# Patient Record
Sex: FEMALE | Race: WHITE | NOT HISPANIC OR LATINO | URBAN - METROPOLITAN AREA
[De-identification: names, ages, dates, MRNs, and addresses within clinical notes are randomized per-mention and may not be internally consistent; named-entity substitution may affect disease eponyms.]

---

## 2020-06-10 ENCOUNTER — IMPORTED ENCOUNTER (OUTPATIENT)
Dept: URBAN - METROPOLITAN AREA CLINIC 9 | Facility: CLINIC | Age: 65
End: 2020-06-10

## 2020-06-22 ENCOUNTER — IMPORTED ENCOUNTER (OUTPATIENT)
Dept: URBAN - METROPOLITAN AREA CLINIC 9 | Facility: CLINIC | Age: 65
End: 2020-06-22

## 2020-07-06 ENCOUNTER — IMPORTED ENCOUNTER (OUTPATIENT)
Dept: URBAN - METROPOLITAN AREA CLINIC 9 | Facility: CLINIC | Age: 65
End: 2020-07-06

## 2020-07-17 ENCOUNTER — IMPORTED ENCOUNTER (OUTPATIENT)
Dept: URBAN - METROPOLITAN AREA CLINIC 9 | Facility: CLINIC | Age: 65
End: 2020-07-17

## 2020-07-23 ENCOUNTER — IMPORTED ENCOUNTER (OUTPATIENT)
Dept: URBAN - METROPOLITAN AREA CLINIC 9 | Facility: CLINIC | Age: 65
End: 2020-07-23

## 2020-07-27 ENCOUNTER — IMPORTED ENCOUNTER (OUTPATIENT)
Dept: URBAN - METROPOLITAN AREA CLINIC 9 | Facility: CLINIC | Age: 65
End: 2020-07-27

## 2020-07-30 ENCOUNTER — IMPORTED ENCOUNTER (OUTPATIENT)
Dept: URBAN - METROPOLITAN AREA CLINIC 9 | Facility: CLINIC | Age: 65
End: 2020-07-30

## 2020-08-17 ENCOUNTER — IMPORTED ENCOUNTER (OUTPATIENT)
Dept: URBAN - METROPOLITAN AREA CLINIC 9 | Facility: CLINIC | Age: 65
End: 2020-08-17

## 2020-08-20 ENCOUNTER — IMPORTED ENCOUNTER (OUTPATIENT)
Dept: URBAN - METROPOLITAN AREA CLINIC 9 | Facility: CLINIC | Age: 65
End: 2020-08-20

## 2020-08-24 ENCOUNTER — IMPORTED ENCOUNTER (OUTPATIENT)
Dept: URBAN - METROPOLITAN AREA CLINIC 9 | Facility: CLINIC | Age: 65
End: 2020-08-24

## 2020-08-27 ENCOUNTER — IMPORTED ENCOUNTER (OUTPATIENT)
Dept: URBAN - METROPOLITAN AREA CLINIC 9 | Facility: CLINIC | Age: 65
End: 2020-08-27

## 2020-08-31 ENCOUNTER — IMPORTED ENCOUNTER (OUTPATIENT)
Dept: URBAN - METROPOLITAN AREA CLINIC 9 | Facility: CLINIC | Age: 65
End: 2020-08-31

## 2020-09-23 ENCOUNTER — IMPORTED ENCOUNTER (OUTPATIENT)
Dept: URBAN - METROPOLITAN AREA CLINIC 9 | Facility: CLINIC | Age: 65
End: 2020-09-23

## 2020-09-23 PROBLEM — Z96.1: Noted: 2020-09-23

## 2021-03-18 NOTE — PATIENT DISCUSSION
Recommend Bilateral lower lid blepharoplasty trans conj laser (discussed risks and benefits of sx. ..). DO NOT REMOVE LLL HEMANGIOMA.

## 2021-03-18 NOTE — PATIENT DISCUSSION
Recommend Mini lower lift, post-tragel, SMAS to chin/cheeks(discussed risks and benefits of sx. .. ).

## 2021-10-16 ASSESSMENT — KERATOMETRY
OS_K1POWER_DIOPTERS: 41.25
OD_K2POWER_DIOPTERS: 42
OS_K1POWER_DIOPTERS: 41.5
OD_K1POWER_DIOPTERS: 41
OD_K1POWER_DIOPTERS: 41
OD_K1POWER_DIOPTERS: 41.25
OS_AXISANGLE2_DEGREES: 98
OD_K2POWER_DIOPTERS: 41.5
OS_K2POWER_DIOPTERS: 42.25
OS_AXISANGLE2_DEGREES: 81
OD_AXISANGLE2_DEGREES: 86
OS_K1POWER_DIOPTERS: 41.5
OS_K2POWER_DIOPTERS: 42.25
OD_AXISANGLE_DEGREES: 4
OD_K1POWER_DIOPTERS: 41
OS_AXISANGLE_DEGREES: 169
OD_AXISANGLE_DEGREES: 180
OD_AXISANGLE_DEGREES: 173
OD_K1POWER_DIOPTERS: 41
OS_K2POWER_DIOPTERS: 42
OD_K1POWER_DIOPTERS: 41
OS_AXISANGLE_DEGREES: 165
OD_AXISANGLE2_DEGREES: 83
OD_AXISANGLE2_DEGREES: 94
OS_K2POWER_DIOPTERS: 42.25
OS_K2POWER_DIOPTERS: 42
OS_K1POWER_DIOPTERS: 41.25
OS_AXISANGLE2_DEGREES: 85
OD_AXISANGLE_DEGREES: 1
OS_AXISANGLE2_DEGREES: 75
OD_AXISANGLE2_DEGREES: 91
OS_AXISANGLE_DEGREES: 175
OD_K2POWER_DIOPTERS: 41.5
OD_AXISANGLE2_DEGREES: 72
OD_K2POWER_DIOPTERS: 41
OD_K2POWER_DIOPTERS: 41.25
OS_AXISANGLE_DEGREES: 171
OD_K2POWER_DIOPTERS: 41.5
OD_AXISANGLE_DEGREES: 176
OS_K1POWER_DIOPTERS: 41.5
OD_AXISANGLE_DEGREES: 162
OS_AXISANGLE2_DEGREES: 79
OD_AXISANGLE2_DEGREES: 90
OS_AXISANGLE_DEGREES: 8

## 2021-10-16 ASSESSMENT — VISUAL ACUITY
OD_SC: 20/25 SN
OS_CC: 20/25 SN
OS_PH: 20/60 SN
OD_SC: 20/70 SN
OD_CC: 20/20 SN
OS_SC: 20/80 SN
OS_SC: 20/400 SN
OS_CC: 20/20 SN
OS_PH: 20/60 -2 SN
OS_CC: 20/20 SN
OS_CC: 20/20 SN
OS_SC: 20/200 SN
OD_CC: 20/20 SN
OD_SC: 20/30 SN
OD_CC: 20/25 SN
OD_PH: 20/30 SN
OD_SC: 20/30 SN
OS_CC: 20/25 SN
OS_CC: 20/20 SN
OD_SC: 20/70 - SN
OD_CC: 20/20 - SN
OS_SC: 20/200 SN
OD_CC: 20/20 SN
OD_CC: 20/20 - SN
OD_CC: 20/30 SN
OS_SC: 20/30 SN
OS_SC: 20/200 SN
OD_SC: 20/100 SN
OD_PH: 20/40 SN
OD_SC: 20/20 SN
OS_CC: 20/20 SN
OS_CC: 20/20 SN
OS_SC: 20/200 SN
OS_SC: 20/25 SN
OD_CC: 20/25 SN
OD_SC: 20/100 SN
OD_SC: 20/50 SN
OS_CC: 20/25 SN
OD_SC: 20/80 SN
OD_PH: 20/40 SN
OS_SC: 20/200 SN
OS_SC: 20/100 SN
OD_SC: 20/30 SN
OS_SC: 20/200 SN
OD_CC: 20/20 SN
OD_CC: 20/20 SN
OD_SC: 20/60 SN

## 2021-10-16 ASSESSMENT — TONOMETRY
OS_IOP_MMHG: 13
OS_IOP_MMHG: 11
OS_IOP_MMHG: 12
OD_IOP_MMHG: 14
OD_IOP_MMHG: 11
OD_IOP_MMHG: 11
OD_IOP_MMHG: 13
OD_IOP_MMHG: 16
OS_IOP_MMHG: 14
OD_IOP_MMHG: 11
OS_IOP_MMHG: 11
OS_IOP_MMHG: 14

## 2021-10-22 ENCOUNTER — ESTABLISHED PATIENT (OUTPATIENT)
Dept: URBAN - METROPOLITAN AREA CLINIC 14 | Facility: CLINIC | Age: 66
End: 2021-10-22

## 2021-10-22 DIAGNOSIS — H35.373: ICD-10-CM

## 2021-10-22 PROCEDURE — 92014 COMPRE OPH EXAM EST PT 1/>: CPT

## 2021-10-22 PROCEDURE — 92015 DETERMINE REFRACTIVE STATE: CPT

## 2021-10-22 ASSESSMENT — KERATOMETRY
OD_AXISANGLE2_DEGREES: 72
OD_K2POWER_DIOPTERS: 41.5
OS_K1POWER_DIOPTERS: 41.25
OS_AXISANGLE_DEGREES: 171
OS_K2POWER_DIOPTERS: 42.25
OD_K1POWER_DIOPTERS: 41
OD_AXISANGLE_DEGREES: 162
OS_AXISANGLE2_DEGREES: 81

## 2021-10-22 ASSESSMENT — VISUAL ACUITY
OS_SC: 20/50
OS_GLARE: 20/25
OD_GLARE: 20/20
OU_SC: 20/25+1
OD_SC: 20/25

## 2021-10-22 ASSESSMENT — TONOMETRY
OS_IOP_MMHG: 14
OD_IOP_MMHG: 14

## 2022-07-02 RX ORDER — AMOXICILLIN 500 MG/1
1 CAPSULE ORAL
COMMUNITY

## 2023-01-19 ENCOUNTER — COMPREHENSIVE EXAM (OUTPATIENT)
Dept: URBAN - METROPOLITAN AREA CLINIC 16 | Facility: CLINIC | Age: 68
End: 2023-01-19

## 2023-01-19 DIAGNOSIS — Z96.1: ICD-10-CM

## 2023-01-19 DIAGNOSIS — H35.371: ICD-10-CM

## 2023-01-19 DIAGNOSIS — H43.813: ICD-10-CM

## 2023-01-19 DIAGNOSIS — H04.123: ICD-10-CM

## 2023-01-19 PROCEDURE — 92015 DETERMINE REFRACTIVE STATE: CPT

## 2023-01-19 PROCEDURE — 92014 COMPRE OPH EXAM EST PT 1/>: CPT

## 2023-01-19 PROCEDURE — 92134 CPTRZ OPH DX IMG PST SGM RTA: CPT

## 2023-01-19 ASSESSMENT — VISUAL ACUITY
OD_SC: 20/20
OS_SC: 20/50
OU_SC: 20/25
OS_SC: 20/20

## 2023-01-19 ASSESSMENT — KERATOMETRY
OD_K1POWER_DIOPTERS: 41
OS_K1POWER_DIOPTERS: 41.25
OS_AXISANGLE2_DEGREES: 81
OD_AXISANGLE_DEGREES: 162
OD_K2POWER_DIOPTERS: 41.5
OD_AXISANGLE2_DEGREES: 72
OS_K2POWER_DIOPTERS: 42.25
OS_AXISANGLE_DEGREES: 171

## 2023-01-19 ASSESSMENT — TONOMETRY
OS_IOP_MMHG: 14
OD_IOP_MMHG: 14